# Patient Record
Sex: FEMALE | Race: WHITE | NOT HISPANIC OR LATINO | Employment: FULL TIME | URBAN - METROPOLITAN AREA
[De-identification: names, ages, dates, MRNs, and addresses within clinical notes are randomized per-mention and may not be internally consistent; named-entity substitution may affect disease eponyms.]

---

## 2017-08-24 ENCOUNTER — AMBULATORY - HEALTHEAST (OUTPATIENT)
Dept: MULTI SPECIALTY CLINIC | Facility: CLINIC | Age: 27
End: 2017-08-24

## 2017-08-24 LAB — PAP SMEAR - HIM PATIENT REPORTED: NORMAL

## 2018-10-25 ENCOUNTER — PRENATAL OFFICE VISIT - HEALTHEAST (OUTPATIENT)
Dept: MIDWIFE SERVICES | Facility: CLINIC | Age: 28
End: 2018-10-25

## 2018-10-25 DIAGNOSIS — F41.9 ANXIETY: ICD-10-CM

## 2018-10-25 DIAGNOSIS — M25.50 CHRONIC JOINT PAIN: ICD-10-CM

## 2018-10-25 DIAGNOSIS — N92.6 MISSED MENSES: ICD-10-CM

## 2018-10-25 DIAGNOSIS — Z34.01 ENCOUNTER FOR SUPERVISION OF NORMAL FIRST PREGNANCY IN FIRST TRIMESTER: ICD-10-CM

## 2018-10-25 DIAGNOSIS — Z23 NEED FOR IMMUNIZATION AGAINST INFLUENZA: ICD-10-CM

## 2018-10-25 DIAGNOSIS — G89.29 CHRONIC JOINT PAIN: ICD-10-CM

## 2018-10-25 DIAGNOSIS — G44.229 CHRONIC TENSION HEADACHE: ICD-10-CM

## 2018-10-25 LAB
BASOPHILS # BLD AUTO: 0 THOU/UL (ref 0–0.2)
BASOPHILS NFR BLD AUTO: 0 % (ref 0–2)
EOSINOPHIL # BLD AUTO: 0.1 THOU/UL (ref 0–0.4)
EOSINOPHIL NFR BLD AUTO: 2 % (ref 0–6)
ERYTHROCYTE [DISTWIDTH] IN BLOOD BY AUTOMATED COUNT: 11.9 % (ref 11–14.5)
HCG UR QL: POSITIVE
HCT VFR BLD AUTO: 35.4 % (ref 35–47)
HGB BLD-MCNC: 11.5 G/DL (ref 12–16)
LYMPHOCYTES # BLD AUTO: 1.7 THOU/UL (ref 0.8–4.4)
LYMPHOCYTES NFR BLD AUTO: 31 % (ref 20–40)
MCH RBC QN AUTO: 29.3 PG (ref 27–34)
MCHC RBC AUTO-ENTMCNC: 32.5 G/DL (ref 32–36)
MCV RBC AUTO: 90 FL (ref 80–100)
MONOCYTES # BLD AUTO: 0.5 THOU/UL (ref 0–0.9)
MONOCYTES NFR BLD AUTO: 8 % (ref 2–10)
NEUTROPHILS # BLD AUTO: 3.2 THOU/UL (ref 2–7.7)
NEUTROPHILS NFR BLD AUTO: 59 % (ref 50–70)
PLATELET # BLD AUTO: 188 THOU/UL (ref 140–440)
PMV BLD AUTO: 9.9 FL (ref 8.5–12.5)
RBC # BLD AUTO: 3.92 MILL/UL (ref 3.8–5.4)
TSH SERPL DL<=0.005 MIU/L-ACNC: 1.79 UIU/ML (ref 0.3–5)
WBC: 5.5 THOU/UL (ref 4–11)

## 2018-10-25 ASSESSMENT — MIFFLIN-ST. JEOR: SCORE: 1366.32

## 2018-10-26 LAB
ABO/RH(D): NORMAL
ABORH REPEAT: NORMAL
ANTIBODY SCREEN: NEGATIVE
BACTERIA SPEC CULT: NO GROWTH
COLLECTION METHOD: NORMAL
HBV SURFACE AG SERPL QL IA: NEGATIVE
HCV AB SERPL QL IA: NEGATIVE
LEAD BLD-MCNC: NORMAL UG/DL
LEAD RETEST: NO
RUBV IGG SERPL QL IA: POSITIVE
T PALLIDUM AB SER QL: NEGATIVE

## 2018-10-29 LAB — LEAD BLDV-MCNC: <2 UG/DL (ref 0–4.9)

## 2018-11-19 ENCOUNTER — COMMUNICATION - HEALTHEAST (OUTPATIENT)
Dept: MIDWIFE SERVICES | Facility: CLINIC | Age: 28
End: 2018-11-19

## 2018-11-28 ENCOUNTER — PRENATAL OFFICE VISIT - HEALTHEAST (OUTPATIENT)
Dept: MIDWIFE SERVICES | Facility: CLINIC | Age: 28
End: 2018-11-28

## 2018-11-28 DIAGNOSIS — G44.229 CHRONIC TENSION-TYPE HEADACHE, NOT INTRACTABLE: ICD-10-CM

## 2018-11-28 DIAGNOSIS — Z34.01 ENCOUNTER FOR SUPERVISION OF NORMAL FIRST PREGNANCY IN FIRST TRIMESTER: ICD-10-CM

## 2018-11-28 ASSESSMENT — MIFFLIN-ST. JEOR: SCORE: 1398.07

## 2018-11-29 ENCOUNTER — COMMUNICATION - HEALTHEAST (OUTPATIENT)
Dept: ADMINISTRATIVE | Facility: CLINIC | Age: 28
End: 2018-11-29

## 2019-01-08 ENCOUNTER — AMBULATORY - HEALTHEAST (OUTPATIENT)
Dept: MIDWIFE SERVICES | Facility: CLINIC | Age: 29
End: 2019-01-08

## 2019-01-08 ENCOUNTER — HOSPITAL ENCOUNTER (OUTPATIENT)
Dept: ULTRASOUND IMAGING | Facility: CLINIC | Age: 29
Discharge: HOME | End: 2019-01-08
Attending: ADVANCED PRACTICE MIDWIFE

## 2019-01-08 DIAGNOSIS — Z34.01 ENCOUNTER FOR SUPERVISION OF NORMAL FIRST PREGNANCY IN FIRST TRIMESTER: ICD-10-CM

## 2019-01-10 ENCOUNTER — PRENATAL OFFICE VISIT - HEALTHEAST (OUTPATIENT)
Dept: MIDWIFE SERVICES | Facility: CLINIC | Age: 29
End: 2019-01-10

## 2019-01-10 DIAGNOSIS — Z34.01 ENCOUNTER FOR SUPERVISION OF NORMAL FIRST PREGNANCY IN FIRST TRIMESTER: ICD-10-CM

## 2019-01-10 ASSESSMENT — MIFFLIN-ST. JEOR: SCORE: 1425.29

## 2019-07-08 ENCOUNTER — HOSPITAL ENCOUNTER (OUTPATIENT)
Dept: RADIOLOGY | Facility: CLINIC | Age: 29
Discharge: HOME | End: 2019-07-08
Attending: OBSTETRICS & GYNECOLOGY

## 2019-07-08 DIAGNOSIS — T83.32XA MALPOSITIONED IUD: ICD-10-CM

## 2019-07-10 ASSESSMENT — MIFFLIN-ST. JEOR
SCORE: 1402.61
SCORE: 1402.61

## 2019-07-15 ENCOUNTER — ANESTHESIA - HEALTHEAST (OUTPATIENT)
Dept: SURGERY | Facility: AMBULATORY SURGERY CENTER | Age: 29
End: 2019-07-15

## 2019-07-15 ENCOUNTER — HOSPITAL ENCOUNTER (OUTPATIENT)
Dept: SURGERY | Facility: AMBULATORY SURGERY CENTER | Age: 29
Discharge: HOME | End: 2019-07-15
Attending: OBSTETRICS & GYNECOLOGY | Admitting: OBSTETRICS & GYNECOLOGY
Payer: COMMERCIAL

## 2019-07-15 ENCOUNTER — SURGERY - HEALTHEAST (OUTPATIENT)
Dept: SURGERY | Facility: AMBULATORY SURGERY CENTER | Age: 29
End: 2019-07-15

## 2019-07-15 DIAGNOSIS — Z30.430 ENCOUNTER FOR IUD INSERTION: ICD-10-CM

## 2019-07-15 LAB
DIPSTICK EXPIRATION DATE - HISTORICAL: NORMAL
DIPSTICK LOT NUMBER - HISTORICAL: NORMAL
HGB BLD-MCNC: 14.2 G/DL
POC PREG URINE (HCG) HE - HISTORICAL: NEGATIVE
POC SPECIFIC GRAVITY, URINE - HISTORICAL: NORMAL
POCT KIT EXPIRATION DATE - HISTORICAL: NORMAL
POCT KIT LOT NUMBER HE - HISTORICAL: NORMAL
POCT NEGATIVE CONTROL HE - HISTORICAL: NORMAL
POCT POSITIVE CONTROL HE - HISTORICAL: NORMAL

## 2019-07-15 RX ORDER — OXYCODONE HYDROCHLORIDE 5 MG/1
5-10 TABLET ORAL EVERY 6 HOURS PRN
Qty: 20 TABLET | Refills: 0 | Status: SHIPPED | OUTPATIENT
Start: 2019-07-15 | End: 2021-12-06

## 2019-07-15 ASSESSMENT — MIFFLIN-ST. JEOR
SCORE: 1402.61
SCORE: 1402.61

## 2021-05-30 NOTE — H&P
I have performed an assessment and examined the patient, as necessary, to update the patient's current status that may have changed since the prior History and Physical.  The History & Physical has been reviewed and changes to the patient's status are as follows CV-rrr. Lungs clear.

## 2021-05-30 NOTE — ANESTHESIA PREPROCEDURE EVALUATION
Anesthesia Evaluation      Patient summary reviewed   No history of anesthetic complications     Airway   Mallampati: I  Neck ROM: full   Pulmonary - negative ROS and normal exam                          Cardiovascular - negative ROS and normal exam   Neuro/Psych - negative ROS     Endo/Other - negative ROS      GI/Hepatic/Renal - negative ROS           Dental - normal exam                        Anesthesia Plan  Planned anesthetic: MAC  Will transition to general/ETT if convert to laparoscopy  ASA 1   Induction: intravenous   Anesthetic plan and risks discussed with: patient    Post-op plan: routine recovery

## 2021-05-30 NOTE — ANESTHESIA POSTPROCEDURE EVALUATION
Patient: Haily LEVY Ringmaikel  HYSTEROSCOPY, REMOVAL OF INTRAUTERINE DEVICE, LAPAROSCOPY WITH ULTRASOUND GUIDANCE AND INSERTION OF IUD  Anesthesia type: MAC    Patient location: Phase II Recovery  Last vitals:   Vitals Value Taken Time   /78 7/15/2019  2:45 PM   Temp 36.2  C (97.1  F) 7/15/2019  2:28 PM   Pulse 52 7/15/2019  2:52 PM   Resp 18 7/15/2019  2:30 PM   SpO2 100 % 7/15/2019  2:52 PM   Vitals shown include unvalidated device data.  Post vital signs: stable  Level of consciousness: awake and responds to simple questions  Post-anesthesia pain: pain controlled  Post-anesthesia nausea and vomiting: no  Pulmonary: unassisted  Cardiovascular: stable  Hydration: adequate  Anesthetic events: no    QCDR Measures:  ASA# 11 - Maggie-op Cardiac Arrest: ASA11B - Patient did NOT experience unanticipated cardiac arrest  ASA# 12 - Maggie-op Mortality Rate: ASA12B - Patient did NOT die  ASA# 13 - PACU Re-Intubation Rate: ASA13B - Patient did NOT require a new airway mgmt  ASA# 10 - Composite Anes Safety: ASA10A - No serious adverse event    Additional Notes:

## 2021-05-30 NOTE — OP NOTE
Operative Note    Name:  Haily Lopez  Location: [facility]  Procedure Date:  7/15/2019  PCP:  Provider, No Primary Care        Pre-Procedure Diagnosis:  IUD threads lo started about    Post-Procedure Diagnosis:    Intra-abdominal IUD ks sitting in a bowl of fluid  Procedure: Hysteroscopy, laparoscopy with removal of IUD.  Insertion of Mirena IUD with ultrasound guidance.  Surgeon(s):  Prabhu Burkett MD    Note she came in with pain  Anesthesia Type:  MAC which converted into general    Findings:  Intrauterine cavity without any foreign bodies including IUDs.  A Mirena IUD was sitting in the cul-de-sac and some serous fluid.  There was no obvious perforation spot however there appeared to be an abraded area just medial to the left uterosacral ligament that is likely where this IUD entered the abdominal cavity.  Otherwise uterus ovaries and tubes are normal.    Complications:    None    Specimens:    Mirena IUD    Estimated Blood Loss:   5 mL from 7/15/2019 12:49 PM to 7/15/2019  1:55 PM    Indications:  Lost IUD, pain    Operative Report:    After appropriate consents MAC anesthesia.  She was placed in the dorsolithotomy position prepped draped in usual sterile manner.  Were obtained and signed patient was brought the operating room and given a Francisco catheter was placed.  A bivalve speculum was placed in the vagina and the cervix was injected with 20 cc 1% lidocaine plain divided at 9:00 and 3:00.  The anterior lip of cervix was grasped with some tooth tenaculum and the uterus was sounded to 8 cm.  Hysteroscope was placed using saline as the distention medium and the above findings were noted.  Intraoperative ultrasound through [facility] also did not see an IUD in the uterus.  There was some question of a foreign body behind the uterus.  I thus made the decision to proceed with a laparoscopy.  The patient had to be reprepped and draped as she had her anesthesia converted to general anesthesia.   An infra umbilical incision was made and a pneumoperitoneum was created through a Veress needle.  A 5 mm port was then placed left risk of the sleeve.  A 5 mm port was placed in the left lower quadrant under direct visualization without complication.  The above findings were noted.  Fluid was aspirated.  The IUD was floating in the serous fluid and not connected to any structures.  It was grasped and brought through 1 of the 5 mm ports without difficulty.  Hemostasis was seen in the pelvis.  The pneumoperitoneum was relieved.  30 cc of quarter percent Marcaine was injected in the peritoneal cavity and the trochars were removed.  Incisions were closed with 4-0 Vicryl.  Steri-Strips and bandages were applied.  Attention was then turned to the vaginal area again.  Under ultrasound guidance I placed a Mirena IUD without difficulty.  The lot number is [id number] with an expiration date of September 2021.  Ultrasound verified that the IUD was in the correct position.  Procedure was terminated.  All estimates removed in the vagina.  The patient was awakened taken recovery room in good condition.    Prabhu Burkett     Date: 7/15/2019  Time: 1:55 PM

## 2021-05-30 NOTE — ANESTHESIA CARE TRANSFER NOTE
Last vitals:   Vitals:    07/15/19 1400   BP: (P) 128/60   Pulse: (!) (P) 53   Resp: (P) 18   Temp: (P) 36.1  C (96.9  F)   SpO2: (P) 100%     Patient spontaneous RR, -400s, suctioned, following commands, extubated to facemask 10LPM, O2 sats 100%. VSS. Report to RN.    Patient's level of consciousness is drowsy  Spontaneous respirations: yes  Maintains airway independently: yes  Dentition unchanged: yes  Oropharynx: oropharynx clear of all foreign objects    QCDR Measures:  ASA# 20 - Surgical Safety Checklist: WHO surgical safety checklist completed prior to induction    PQRS# 430 - Adult PONV Prevention: 4558F - Pt received => 2 anti-emetic agents (different classes) preop & intraop  ASA# 8 - Peds PONV Prevention: NA - Not pediatric patient, not GA or 2 or more risk factors NOT present  PQRS# 424 - Maggie-op Temp Management: 4559F - At least one body temp DOCUMENTED => 35.5C or 95.9F within required timeframe  PQRS# 426 - PACU Transfer Protocol: - Transfer of care checklist used  ASA# 14 - Acute Post-op Pain: ASA14B - Patient did NOT experience pain >= 7 out of 10

## 2021-06-02 VITALS — HEIGHT: 68 IN | WEIGHT: 147 LBS | BODY MASS INDEX: 22.28 KG/M2

## 2021-06-02 VITALS — BODY MASS INDEX: 20.31 KG/M2 | WEIGHT: 134 LBS | HEIGHT: 68 IN

## 2021-06-02 VITALS — WEIGHT: 141 LBS | BODY MASS INDEX: 21.37 KG/M2 | HEIGHT: 68 IN

## 2021-06-03 VITALS
BODY MASS INDEX: 21.52 KG/M2 | BODY MASS INDEX: 21.52 KG/M2 | HEIGHT: 68 IN | WEIGHT: 142 LBS | HEIGHT: 68 IN | WEIGHT: 142 LBS

## 2021-06-16 PROBLEM — F41.9 ANXIETY: Status: ACTIVE | Noted: 2018-10-25

## 2021-06-16 PROBLEM — G89.29 CHRONIC JOINT PAIN: Status: ACTIVE | Noted: 2018-10-25

## 2021-06-16 PROBLEM — G44.229 CHRONIC TENSION HEADACHE: Status: ACTIVE | Noted: 2018-10-25

## 2021-06-16 PROBLEM — M25.50 CHRONIC JOINT PAIN: Status: ACTIVE | Noted: 2018-10-25

## 2021-06-21 NOTE — PROGRESS NOTES
PRENATAL VISIT   FIRST OBSTETRICAL EXAM - OB    Assessment / Impression     27 yo  Initial Prenatal Visit  Chronic joint pain  Chronic Headaches  Anxiety, manages with meditation  Last pap: 17, Negative, due 2020  EDPS: 7   Flu vaccine today  Low mood today    Plan:       - IOB labs drawn., Additional labs include: lead screening, thyroid screening (given hx of anxiety) Declines GC/CT screening. and  Pap smear screening not indicated and due 2020.  -Pt is interested in drawing lead level.  -Patient is interested in waterbirth. Hep C drawn today.  -Reviewed prenatal care schedule and discussed routine OB visits versus problem visits and referrals. Also discussed use of ultrasound in pregnancy.  -Declined  early US; reviewed dating. Dating by sure LMP.   -Optimal nutrition and weight gain discussed. Pregnancy weight gain of 25-35 lbs (BMI 18.5-24.9) encouraged.   -Reviewed importance of regular exercise in pregnancy and help with low back pain and other pregnancy symptoms.   -Discussed importance of taking aprenatal vitamin with the goal of having 400 mcg of folic acid. Additionally taking a Vitamin D supplement (1,000-2,000 IU/day) and an Omega 3/fish oil/DHA is beneficial. Research also supports taking 150 mcg of Iodine when pregnant.  -Anticipatory guidance for common pregnancy questions and concerns reviewed.   -Danger s/sx for this trimester reviewed with patient.  -Reviewed genetic carrier screening specific to patients ethnic heritage. Patient declines  -Reviewed genetic aneuploidy screening options. Patient declines NIPS  -Reviewed MyChart, lab results, and how lab results are disseminated.   -IOB packet given and reviewed with patient, discussed standards of care, scope of practice, clinic and hospital settings, also reviewed clinic versus emergency phone number.   -Discussed baby friend hospitals, policies, and recommendations regarding breastfeeding as well as professional and community  support. Discussed the benefits of breastfeeding including: decreased childhood obesity or diabetes, decreased recurrence of ear infections, and decreased chance of hospitalization for respiratory conditions  Additionally, giving  formula instead of breast milk can affect the mother's supply. And formula alters the natural growth of good bacteria in the 's stomach.   -[facility] services and hospital options reviewed; emergency and scheduling phone numbers given to patient.  -Discussed low mood and encouraged Haily and Zac to consider therapy as they have multiple life stressors right now, a recent move, graduate school (Haily), new job (Zac), pregnancy! Offered our support as they navigate these stressors. Encouraged Haily to call if her mood worsens and she is feeling depressed or anxious  -Return to clinic 4 weeks    Total time spent with patient: 60 minutes, >50% time spent counseling and coordinating care.    Subjective:      Haily Lopez is a 28 y.o.  here today for her First Obstetrical Exam.    LMP: 18, bled for 4-5, typically has a 25-28 day cycle. Based on LMP she is 9 weeks 6 days  Has been nauseous and fatigued  Does not have any dietary restrictions. Eats a variety of food.   Feeling a little blue since being pregnant. She has a history of anxiety, but has not felt down like this before. She has a therapist she has seen for her anxiety, and she knows she can schedule a meeting with her as needed. Currently meditation has been sufficient to manage her anxiety  She is finishing her Masters in [employer] at [employer], graduating in May, will be a [employer] , but plans to wait to work until 2019. Just moved to [place] from [facility]    Haily has chronic joint pain and sees Dr. Ruano in [place] at the [facility]. She feels it is currently under control  Haily also has chronic headaches, usually twice a week, so far she has had fewer in pregnancy. She manages them with  hydration and tylenol     Exercise: a little less due to fatigue, typically bikes, and walks  Safety (seatbelt, gun access, IPV, hx abuse): No  Tobacco/Alcohol/Drug Use: No, occasional sips of wine  EPDS today: 7  Feeding Plans Breastfeeding.    Education level: working on Masters in [employer]  Occupation: Graduate school student at [employer]  Partners name: Zac,     OB History    Para Term  AB Living   1        SAB TAB Ectopic Multiple Live Births             # Outcome Date GA Lbr Rocky/2nd Weight Sex Delivery Anes PTL Lv   1 Current                   Expected Date of Delivery: 18    Past Medical History:   Diagnosis Date     Anemia     on and off     Chronic joint pain     may have been triggered by Lyme's disease, chronic pain clinic     Depression     Anxiety, has a prn med clonadapam, therapy     Migraine     headaches during pregnancy, headache every 2 weeks, treats with hydration, tylenol     Urinary tract infection     occasional     Past Surgical History:   Procedure Laterality Date     WISDOM TOOTH EXTRACTION      no reaction to anesthesia     Social History   Substance Use Topics     Smoking status: Never Smoker     Smokeless tobacco: Never Used     Alcohol use None     Current Outpatient Prescriptions   Medication Sig Dispense Refill     acetaminophen (TYLENOL EXTRA STRENGTH) 500 MG tablet Take 500 mg by mouth as needed for pain.       cetirizine (ZYRTEC) 10 MG tablet Take 10 mg by mouth as needed.        cholecalciferol, vitamin D3, 1,000 unit capsule Take by mouth.       fluticasone (FLONASE) 50 mcg/actuation nasal spray 1 spray into each nostril.       PNV cmb#95-ferrous fumarate-FA (PRENATAL VITAMIN WITH MINERALS) 28 mg iron- 800 mcg Tab Take by mouth.       No current facility-administered medications for this visit.      No Known Allergies          Pregnancy Risk Factors: None    Review of Systems  General:  Denies problem  Eyes: Denies problem  Ears/Nose/Throat:  "Denies problem  Cardiovascular: Denies problem  Respiratory:  Denies problem  Gastrointestinal:  Denies problem  Genitourinary: Denies problem  Musculoskeletal:  Denies problem  Skin: Denies problem  Neurologic: Denies problem  Psychiatric: Denies problem  Endocrine: Denies problem  Heme/Lymphatic: Denies problem   Allergic/Immunologic: Denies problem       Objective:   Objective    Vitals:    10/25/18 0859   BP: 96/60   Pulse: 92   Weight: 134 lb (60.8 kg)   Height: 5' 8\" (1.727 m)     Physical Exam:  General Appearance: Alert, cooperative, no distress, appears stated age  Head: Normocephalic, without obvious abnormality, atraumatic  Eyes: Conjunctiva/corneas clear, does not wear corrective lenses  Neck: Supple, symmetrical, trachea midline, no adenopathy  Thyroid: not enlarged, symmetric, no tenderness/mass/nodules  Back: Symmetric, no curvature, ROM normal, no CVA tenderness  Lungs: Clear to auscultation bilaterally, respirations unlabored  Heart: Regular rate and rhythm, S1 and S2 normal, no murmur, rub, or gallop  Breasts: No breast masses, tenderness, asymmetry, or nipple discharge. Nipples are everted.  Abdomen: Soft, non-tender, no masses.   FHT:  180   Vulva:  no sign of lesions or condyloma, normal hair distribution  Vagina: pink, normal rugae, no abnormal discharge  Cervix:  long/thick/closed, no lesions or inflammation noted, negative CMT with exam  Uterus: mid/anterverted position, non tender, enlarged approximately 10 weeks size  Adnexa:  no masses appreciated, non-tender with palpation  Pelvic spines:AVERAGE  Sacrum:CONCAVE  Suprapubic Arch:NORMAL  Pelvis type:gynecoid            Extremities: Extremities normal, atraumatic, no cyanosis or edema  Skin: Skin color, texture, turgor normal, no rashes or lesions  Lymph nodes: Cervical, supraclavicular, and axillary nodes normal  Neurologic: Alert and oriented x 3.    Lab:   Results for orders placed or performed in visit on 10/25/18   Pregnancy (Beta-hCG, " Qual), Urine   Result Value Ref Range    Pregnancy Test, Urine Positive (!) Negative

## 2021-06-22 NOTE — PROGRESS NOTES
Haily presents with Zac today  Reviewed normal FAS from 1/8/19 which reveals a SLIUP, normal anatomy, posterior placenta, EFW 64%  Haily and Zac are considering transferring to a birth center for their care. They have multiple questions about our practice: typical birth practices, indications for induction, etc. Answered all questions and offered a follow up phone call if she has any further questions  Haily is Graduating 5/19/19 Masters in [employer] from [employer]. She is hoping the baby doesn't come before then (EDB: 5/24/19)  Nutrition: eating a well balanced diet, small frequent meals, trying to get adequate protein  Exercise:   Mood: Feels stable, managing her stress well  Discussed the benefits of  care and gave references  Water birth consent form signed     92

## 2021-06-22 NOTE — PROGRESS NOTES
Haily presents to the clinic by herself.  (Please see telephone note below.)  Occasional right-sided lower abdominal sharp pain with sneezing unless she braces herself by tightening abdominal.  Discomfort is not constant, and she denies dysuria, unusual vaginal discharge/pruritus/irritation/malodor, vaginal bleeding or loss of fluid.  Headaches (which predate pregnancy) continue intermittently, helped by acetaminophen, a caffeinated beverage, rest and a dark room.  Endorses sensitivity to light.  Last eyeglass prescription was over 2 years ago.  These are not the worst headaches of her life.  This writer recommends the following comfort measures: Acetaminophen as directed, massage, chiropractic medicine, caffeine, adequate hydration, rest.  She is regarding [facility] nurse midwives statistics regarding  birth, episiotomy and other interventions which were reviewed in their entirety.  This patient is exploring the possibility of another birth setting possibly with a smaller midwife practice.  Initial OB lab results reviewed and all WNL.  Declines second trimester genetic screening.  20-week fetal anatomy survey ultrasound was ordered, and patient will RTC in 6 weeks.  Second trimester teaching completed.  Danger signs and symptoms reviewed.  All questions answered.  Encouraged to call or return to clinic with any questions, concerns, or as needed.

## 2021-06-27 ENCOUNTER — HEALTH MAINTENANCE LETTER (OUTPATIENT)
Age: 31
End: 2021-06-27

## 2021-06-30 ENCOUNTER — THERAPY VISIT (OUTPATIENT)
Dept: PHYSICAL THERAPY | Facility: CLINIC | Age: 31
End: 2021-06-30
Payer: COMMERCIAL

## 2021-06-30 DIAGNOSIS — M62.08 DIASTASIS RECTI: ICD-10-CM

## 2021-06-30 PROCEDURE — 97110 THERAPEUTIC EXERCISES: CPT | Mod: GP | Performed by: PHYSICAL THERAPIST

## 2021-06-30 PROCEDURE — 97112 NEUROMUSCULAR REEDUCATION: CPT | Mod: GP | Performed by: PHYSICAL THERAPIST

## 2021-06-30 PROCEDURE — 97161 PT EVAL LOW COMPLEX 20 MIN: CPT | Mod: GP | Performed by: PHYSICAL THERAPIST

## 2021-06-30 NOTE — PROGRESS NOTES
Physical Therapy Initial Evaluation  Subjective:  The history is provided by the patient. No  was used.   Patient Health History  Haily Lopez being seen for Diastasis Recti.     Problem began: 5/22/2019.   Problem occurred: My last pregnancy   Pain is reported as 0/10 on pain scale.  General health as reported by patient is excellent.  Pertinent medical history includes: anemia and migraines/headaches. Other medical history details: Chronic joint pain from 9623-3667.     Medical allergies: none.   Surgeries include:  Other. Other surgery history details: laproscopic abdominal surgery to remove IUD after perforation.    Current medications:  None.    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                  Therapist Generated HPI Evaluation  Problem details: Patient reports she had a large baby (10# plus) with her first delivery.  Had a normal vaginal birth;  A lot of tearing but no residual symptoms.  Has been working with a strength LENNY which has been helpful to assist with abdominal strength..         Type of problem:  Other (Abdominal).    This is a new condition.  Condition occurred with:  After pregnancy.  Where condition occurred: other.  Patient reports pain:  N/a.      Since onset symptoms are gradually improving.         Restrictions due to condition include:  Working in normal job without restrictions.  Barriers include:  None as reported by patient.                        Objective:  System                                 Pelvic Dysfunction Evaluation:          Abdominal Wall:  Abdominal wall pelvic: Superior 0-5 cm 3 finger width 5-8 cm 3 finger width  Inferior 0-5cm 3 finger width.  Diastasis Recti:  Present  Trigger Points:  Transverse abdominals, internal obliques and external obliques              Additional History:  Delivery History:  Vaginal delivery  Number of Pregnancies: 1  Number of Live Births: 1                        General     ROS    Assessment/Plan:    Patient is a 31 year old female with pelvic complaints.    Patient has the following significant findings with corresponding treatment plan.                Diagnosis 1:  Abdominal; Diastasis Recti  Decreased strength - therapeutic exercise and therapeutic activities  Impaired muscle performance - neuro re-education  Decreased function - therapeutic activities        Previous and current functional limitations:  (See Goal Flow Sheet for this information)    Short term and Long term goals: (See Goal Flow Sheet for this information)     Communication ability:  Patient appears to be able to clearly communicate and understand verbal and written communication and follow directions correctly.  Treatment Explanation - The following has been discussed with the patient:   RX ordered/plan of care  Anticipated outcomes  Possible risks and side effects  This patient would benefit from PT intervention to resume normal activities.   Rehab potential is good.    Frequency:  1 X week, once daily  Duration:  for 6 weeks  Discharge Plan:  Achieve all LTG.  Independent in home treatment program.  Reach maximal therapeutic benefit.    Please refer to the daily flowsheet for treatment today, total treatment time and time spent performing 1:1 timed codes.

## 2021-06-30 NOTE — LETTER
[facility]  [address]  [facility] [address]  [phone]    2021    Re: Haily Lopez   :   1990  MRN:  [id number]   REFERRING PHYSICIAN:   Niki [facility]EVY [facility]  Date of Initial Evaluation: 21  Visits:  Rxs Used: 1  Reason for Referral:  Diastasis recti    EVALUATION SUMMARY    Physical Therapy Initial Evaluation    Subjective:  The history is provided by the patient. No  was used.     Patient Health History  Haily Lopez being seen for Diastasis Recti.     Problem began: 2019.   Problem occurred: My last pregnancy   Pain is reported as 0/10 on pain scale.  General health as reported by patient is excellent.  Pertinent medical history includes: anemia and migraines/headaches. Other medical history details: Chronic joint pain from 1378-3596.     Medical allergies: none.   Surgeries include:  Other. Other surgery history details: laproscopic abdominal surgery to remove IUD after perforation.    Current medications:  None.    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                Therapist Generated HPI Evaluation  Problem details: Patient reports she had a large baby (10# plus) with her first delivery.  Had a normal vaginal birth;  A lot of tearing but no residual symptoms.  Has been working with a strength LENNY which has been helpful to assist with abdominal strength..         Type of problem:  Other (Abdominal).    This is a new condition.  Condition occurred with:  After pregnancy.  Where condition occurred: other.  Patient reports pain:  N/a.    Since onset symptoms are gradually improving.   Restrictions due to condition include:  Working in normal job without restrictions.  Barriers include:  None as reported by patient.    Objective:       Pelvic Dysfunction Evaluation:      Abdominal Wall:  Abdominal  wall pelvic: Superior 0-5 cm 3 finger width 5-8 cm 3 finger width  Inferior 0-5cm 3 finger width.  Diastasis Recti:  Present  Trigger Points:  Transverse abdominals, internal obliques and external obliques    Additional History:  Delivery History:  Vaginal delivery  Number of Pregnancies: 1  Number of Live Births: 1    Assessment/Plan:    Patient is a 31 year old female with pelvic complaints.    Patient has the following significant findings with corresponding treatment plan.                  Diagnosis 1:  Abdominal; Diastasis Recti  Decreased strength - therapeutic exercise and therapeutic activities  Impaired muscle performance - neuro re-education  Decreased function - therapeutic activities    Previous and current functional limitations:  (See Goal Flow Sheet for this information)    Short term and Long term goals: (See Goal Flow Sheet for this information)     Communication ability:  Patient appears to be able to clearly communicate and understand verbal and written communication and follow directions correctly.    Treatment Explanation - The following has been discussed with the patient:   RX ordered/plan of care  Anticipated outcomes  Possible risks and side effects    This patient would benefit from PT intervention to resume normal activities.   Rehab potential is good.  Frequency:  1 X week, once daily  Duration:  for 6 weeks  Discharge Plan:  Achieve all LTG.  Independent in home treatment program.  Reach maximal therapeutic benefit.    Thank you for your referral.    INQUIRIES  Therapist: Janie Chun DPT  [facility]  [address]  [facility] [address]  Phone: [phone]  Fax: [phone]

## 2021-08-11 PROBLEM — M62.08 DIASTASIS RECTI: Status: RESOLVED | Noted: 2021-06-30 | Resolved: 2021-08-11

## 2021-10-17 ENCOUNTER — HEALTH MAINTENANCE LETTER (OUTPATIENT)
Age: 31
End: 2021-10-17

## 2021-11-17 ENCOUNTER — TELEPHONE (OUTPATIENT)
Dept: ENDOCRINOLOGY | Facility: CLINIC | Age: 31
End: 2021-11-17
Payer: COMMERCIAL

## 2021-11-17 NOTE — TELEPHONE ENCOUNTER
[facility] Phone: [phone]  Spoke w/  to please include lab results with referral.   Izabel Vaca RN on 11/17/2021 at 1:56 PM

## 2021-11-17 NOTE — TELEPHONE ENCOUNTER
Lindsay Cervantes, RN at [facility]  States Pt is pregnant and being seen at [facility] Due March 18, 2022  subclinical hypothyroidism  Anemia  Fatigue  Requesting Urgent Consult   Will fax referral today.   Endo Team notified.   Izabel Vaca RN on 11/17/2021 at 11:43 AM

## 2021-11-18 ENCOUNTER — TRANSCRIBE ORDERS (OUTPATIENT)
Dept: OTHER | Age: 31
End: 2021-11-18
Payer: COMMERCIAL

## 2021-11-18 DIAGNOSIS — R53.83 FATIGUE: ICD-10-CM

## 2021-11-18 DIAGNOSIS — Z3A.22 22 WEEKS GESTATION OF PREGNANCY: Primary | ICD-10-CM

## 2021-11-18 DIAGNOSIS — D64.9 ANEMIA: ICD-10-CM

## 2021-11-18 DIAGNOSIS — E03.8 SUBCLINICAL HYPOTHYROIDISM: ICD-10-CM

## 2021-12-05 NOTE — PROGRESS NOTES
Endocrinology Note         Haily is a 31 year old female presents today for abnormal thyroid function test during pregnancy    HPI  Haily is a 31 year old female presents today for abnormal thyroid function test during pregnancy    She is currently 25 weeks pregnant. This is her second pregnancy.     She has been feeling extremely fatigue throughout pregnancy. She feels that she has to take a nap in the afternoon for 2 hours. She also notices brain fog. She feels some slight improvement this past few weeks. She denied altered bowel movement, temperature intolerance. She has not had dizziness, lightheadedness, chest pain, SOB. She gained weight properly. She feels that her sleep is disrupted. Her first pregnancy was 2.5 years ago. She did not have this symptom during her first pregnancy or in-between pregnancy.    Lab at her midwife's clinic on 10/28/2021 showed TSH 1.67, total T4 2.1 and total T3 21.    She takes prenatal vitamin that contains biotin 35 mcg, iron, magnesium.    No thyroid disease in the family    Past Medical History  No past medical history on file.    Allergies  No Known Allergies     Medications  Current Outpatient Medications   Medication Sig Dispense Refill     oxyCODONE (ROXICODONE) 5 MG immediate release tablet [OXYCODONE (ROXICODONE) 5 MG IMMEDIATE RELEASE TABLET] Take 1-2 tablets (5-10 mg total) by mouth every 6 (six) hours as needed for pain. 20 tablet 0     Family History  family history includes Arthritis in her maternal grandfather, maternal grandmother, and paternal grandmother; Asthma in her father; Dementia in her maternal grandmother; Depression in her mother; Diabetes in her maternal grandmother; Early Death (age of onset: 50.00) in her paternal grandfather; Hearing Loss in her maternal grandfather; Heart Disease in her maternal grandfather; Hypertension in her maternal grandfather; Other - See Comments in her paternal grandfather.     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[employer]    Lives at home    ROS  Constitutional: no weight change, +low energy  Eyes: no vision change, diplopia or red eyes   Neck: no difficulty swallowing, no choking, no neck pain, no neck swelling  Cardiovascular: no chest pain, palpitations  Respiratory: no dyspnea, cough, shortness of breath or wheezing   GI: no nausea, vomiting, diarrhea or constipation, no abdominal pain   : no change in urine, no dysuria or hematuria  Musculoskeletal: no joint or muscle pain or swelling   Integumentary: no concerning lesions   Neuro: no loss of strength or sensation, no numbness or tingling, no tremor, no dizziness, no headache   Endo: no polyuria or polydipsia, no temperature intolerance   Heme/Lymph: no concerning bumps, no bleeding problems   Allergy: no environmental allergies   Psych: no depression or anxiety, needs to take a nap in the afternoon during this pregnany.    Physical Exam  Limited due to virtual visit  Constitutional: no distress, comfortable, pleasant   Eyes: anicteric,no lid lag or retraction  Skin:no jaundice   Psychological: appropriate mood       RESULTS  I have personally reviewed labs and images. I also reviewed labs with patient and discussed the result and plan of care.  ENDO THYROID LABS-[facility] Latest Ref Rng & Units 10/25/2018   TSH 0.30 - 5.00 uIU/mL 1.79     Lab on 10/28/2021      ASSESSMENT:    Haily is a 31 year old female presents today for abnormal thyroid function test during pregnancy    1) Abnormal thyroid function test during second trimester of pregnancy: she has extreme fatigue throughout this pregnancy. No prior thyroid disease or family hx of thyroid disease. Her labs 6 weeks ago showed normal TSH but low total T4  and T3 which is quite unusual as binding protein is commonly high during pregnancy and it is expected to see higher T4 and T3.   - we will start with repeating lab today.     2) 25 weeks pregnancy    PLAN:   - recheck TSH, FT4, total T4, total T3, TPO Ab    Start: 12/06/2021 10:38 am  Stop: 12/06/2021 10:52 am  VDO duration: 14 minutes    External notes/medical records independently reviewed, labs and imaging independently reviewed, medical management and tests to be discussed/communicated to patient.    Time: I spent 37 minutes spent on the date of the encounter preparing to see patient (including chart review and preparation), obtaining and or reviewing additional medical history, performing a physical exam and evaluation, documenting clinical information in the electronic health record, independently interpreting results, communicating results to the patient and coordinating care.    Hoang Pete MD  Division of Diabetes and Endocrinology  Department of Medicine  [phone]

## 2021-12-06 ENCOUNTER — TELEPHONE (OUTPATIENT)
Dept: ENDOCRINOLOGY | Facility: CLINIC | Age: 31
End: 2021-12-06

## 2021-12-06 ENCOUNTER — VIRTUAL VISIT (OUTPATIENT)
Dept: ENDOCRINOLOGY | Facility: CLINIC | Age: 31
End: 2021-12-06
Payer: COMMERCIAL

## 2021-12-06 DIAGNOSIS — R94.6 THYROID FUNCTION TEST ABNORMAL: Primary | ICD-10-CM

## 2021-12-06 PROCEDURE — 99203 OFFICE O/P NEW LOW 30 MIN: CPT | Mod: GT | Performed by: INTERNAL MEDICINE

## 2021-12-06 RX ORDER — CHLORAL HYDRATE 500 MG
CAPSULE ORAL
COMMUNITY

## 2021-12-06 RX ORDER — SWAB
SWAB, NON-MEDICATED MISCELLANEOUS
COMMUNITY

## 2021-12-06 NOTE — PROGRESS NOTES
Haily is a 31 year old who is being evaluated via a billable video visit.      How would you like to obtain your AVS? MyChart  If the video visit is dropped, the invitation should be resent by: Send to e-mail at: [email or web address]  Will anyone else be joining your video visit? No      Mona HUTSON MA   [facility]

## 2021-12-06 NOTE — LETTER
12/6/2021         RE: Haily Lopez  [address] [place] [address]        Dear Colleague,    Thank you for referring your patient, Haily Lopez, to the [facility]. Please see a copy of my visit note below.         Endocrinology Note         Haily is a 31 year old female presents today for abnormal thyroid function test during pregnancy    HPI  Haily is a 31 year old female presents today for abnormal thyroid function test during pregnancy    She is currently 25 weeks pregnant. This is her second pregnancy.     She has been feeling extremely fatigue throughout pregnancy. She feels that she has to take a nap in the afternoon for 2 hours. She also notices brain fog. She feels some slight improvement this past few weeks. She denied altered bowel movement, temperature intolerance. She has not had dizziness, lightheadedness, chest pain, SOB. She gained weight properly. She feels that her sleep is disrupted. Her first pregnancy was 2.5 years ago. She did not have this symptom during her first pregnancy or in-between pregnancy.    Lab at her midwife's clinic on 10/28/2021 showed TSH 1.67, total T4 2.1 and total T3 21.    She takes prenatal vitamin that contains biotin 35 mcg, iron, magnesium.    No thyroid disease in the family    Past Medical History  No past medical history on file.    Allergies  No Known Allergies     Medications  Current Outpatient Medications   Medication Sig Dispense Refill     oxyCODONE (ROXICODONE) 5 MG immediate release tablet [OXYCODONE (ROXICODONE) 5 MG IMMEDIATE RELEASE TABLET] Take 1-2 tablets (5-10 mg total) by mouth every 6 (six) hours as needed for pain. 20 tablet 0     Family History  family history includes Arthritis in her maternal grandfather, maternal grandmother, and paternal grandmother; Asthma in her father; Dementia in her maternal grandmother; Depression in her mother; Diabetes in her maternal grandmother; Early Death (age of onset:  50.00) in her paternal grandfather; Hearing Loss in her maternal grandfather; Heart Disease in her maternal grandfather; Hypertension in her maternal grandfather; Other - See Comments in her paternal grandfather.     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    ROS  Constitutional: no weight change, +low energy  Eyes: no vision change, diplopia or red eyes   Neck: no difficulty swallowing, no choking, no neck pain, no neck swelling  Cardiovascular: no chest pain, palpitations  Respiratory: no dyspnea, cough, shortness of breath or wheezing   GI: no nausea, vomiting, diarrhea or constipation, no abdominal pain   : no change in urine, no dysuria or hematuria  Musculoskeletal: no joint or muscle pain or swelling   Integumentary: no concerning lesions   Neuro: no loss of strength or sensation, no numbness or tingling, no tremor, no dizziness, no headache   Endo: no polyuria or polydipsia, no temperature intolerance   Heme/Lymph: no concerning bumps, no bleeding problems   Allergy: no environmental allergies   Psych: no depression or anxiety, needs to take a nap in the afternoon during this pregnany.    Physical Exam  Limited due to virtual visit  Constitutional: no distress, comfortable, pleasant   Eyes: anicteric,no lid lag or retraction  Skin:no jaundice   Psychological: appropriate mood       RESULTS  I have personally reviewed labs and images. I also reviewed labs with patient and discussed the result and plan of care.  ENDO THYROID LABS-[facility] Latest Ref Rng & Units 10/25/2018   TSH 0.30 - 5.00 uIU/mL 1.79     Lab on 10/28/2021      ASSESSMENT:    Haily is a 31 year old female presents today for abnormal thyroid function test during  pregnancy    1) Abnormal thyroid function test during second trimester of pregnancy: she has extreme fatigue throughout this pregnancy. No prior thyroid disease or family hx of thyroid disease. Her labs 6 weeks ago showed normal TSH but low total T4 and T3 which is quite unusual as binding protein is commonly high during pregnancy and it is expected to see higher T4 and T3.   - we will start with repeating lab today.     2) 25 weeks pregnancy    PLAN:   - recheck TSH, FT4, total T4, total T3, TPO Ab    Start: 12/06/2021 10:38 am  Stop: 12/06/2021 10:52 am  VDO duration: 14 minutes    External notes/medical records independently reviewed, labs and imaging independently reviewed, medical management and tests to be discussed/communicated to patient.    Time: I spent 37 minutes spent on the date of the encounter preparing to see patient (including chart review and preparation), obtaining and or reviewing additional medical history, performing a physical exam and evaluation, documenting clinical information in the electronic health record, independently interpreting results, communicating results to the patient and coordinating care.    Hoang Pete MD  Division of Diabetes and Endocrinology  Department of Medicine  [phone]      Haily is a 31 year old who is being evaluated via a billable video visit.      How would you like to obtain your AVS? MyChart  If the video visit is dropped, the invitation should be resent by: Send to e-mail at: [email or web address]  Will anyone else be joining your video visit? No      Mona HUTSON MA   [facility]   [facility]        Again, thank you for allowing me to participate in the care of your patient.        Sincerely,        Hoang Pete MD

## 2021-12-06 NOTE — PATIENT INSTRUCTIONS
Lab today    If you have any questions, please do not hesitate to call [facility] at [phone] and ask for Endocrinology clinic.    Sincerely,    Hoang Pete MD  Endocrinology

## 2021-12-06 NOTE — TELEPHONE ENCOUNTER
Records received and emailed to provider to review    Mona HUTSON MA   Adult Endocrine   [facility]

## 2021-12-06 NOTE — TELEPHONE ENCOUNTER
Called [facility] and requested for chart notes/labs as they were never received.    Will await for records.    Mona HUTSON MA   Adult Endocrine   [facility]

## 2021-12-08 ENCOUNTER — LAB (OUTPATIENT)
Dept: LAB | Facility: CLINIC | Age: 31
End: 2021-12-08
Payer: COMMERCIAL

## 2021-12-08 DIAGNOSIS — R94.6 THYROID FUNCTION TEST ABNORMAL: ICD-10-CM

## 2021-12-08 LAB
T3 SERPL-MCNC: 122 NG/DL (ref 60–181)
T4 FREE SERPL-MCNC: 0.84 NG/DL (ref 0.76–1.46)
T4 SERPL-MCNC: 12.8 UG/DL (ref 4.5–13.9)
TSH SERPL DL<=0.005 MIU/L-ACNC: 1.99 MU/L (ref 0.4–4)

## 2021-12-08 PROCEDURE — 84443 ASSAY THYROID STIM HORMONE: CPT

## 2021-12-08 PROCEDURE — 36415 COLL VENOUS BLD VENIPUNCTURE: CPT

## 2021-12-08 PROCEDURE — 86376 MICROSOMAL ANTIBODY EACH: CPT

## 2021-12-08 PROCEDURE — 84439 ASSAY OF FREE THYROXINE: CPT

## 2021-12-08 PROCEDURE — 84480 ASSAY TRIIODOTHYRONINE (T3): CPT

## 2021-12-09 LAB — THYROPEROXIDASE AB SERPL-ACNC: <10 IU/ML

## 2022-01-25 ENCOUNTER — TELEPHONE (OUTPATIENT)
Dept: OBGYN | Facility: CLINIC | Age: 32
End: 2022-01-25
Payer: COMMERCIAL

## 2022-01-25 NOTE — TELEPHONE ENCOUNTER
Reason for Call:  Other call back    Detailed comments: Patient called wanting to tranfer established care     Phone Number Patient can be reached at: Home number on file [phone] (home)    Best Time: anytime    Can we leave a detailed message on this number? YES    Call taken on 1/25/2022 at 9:45 AM by Mary Corona

## 2022-01-25 NOTE — TELEPHONE ENCOUNTER
LMTCB. Please assist w/ scheduling patient with either Ana Shea or Dr. Kathleen Cárdenas. They're both booking out to March for [facility] [place] [facility].

## 2022-02-01 ENCOUNTER — MEDICAL CORRESPONDENCE (OUTPATIENT)
Dept: HEALTH INFORMATION MANAGEMENT | Facility: CLINIC | Age: 32
End: 2022-02-01
Payer: COMMERCIAL

## 2022-02-01 DIAGNOSIS — D50.8 IRON DEFICIENCY ANEMIA SECONDARY TO INADEQUATE DIETARY IRON INTAKE: Primary | ICD-10-CM

## 2022-02-01 RX ORDER — ALBUTEROL SULFATE 0.83 MG/ML
2.5 SOLUTION RESPIRATORY (INHALATION)
Status: CANCELLED | OUTPATIENT
Start: 2022-02-01

## 2022-02-01 RX ORDER — MEPERIDINE HYDROCHLORIDE 25 MG/ML
25 INJECTION INTRAMUSCULAR; INTRAVENOUS; SUBCUTANEOUS EVERY 30 MIN PRN
Status: CANCELLED | OUTPATIENT
Start: 2022-02-01

## 2022-02-01 RX ORDER — DIPHENHYDRAMINE HYDROCHLORIDE 50 MG/ML
50 INJECTION INTRAMUSCULAR; INTRAVENOUS
Status: CANCELLED
Start: 2022-02-01

## 2022-02-01 RX ORDER — METHYLPREDNISOLONE SODIUM SUCCINATE 125 MG/2ML
125 INJECTION, POWDER, LYOPHILIZED, FOR SOLUTION INTRAMUSCULAR; INTRAVENOUS
Status: CANCELLED
Start: 2022-02-01

## 2022-02-01 RX ORDER — HEPARIN SODIUM (PORCINE) LOCK FLUSH IV SOLN 100 UNIT/ML 100 UNIT/ML
5 SOLUTION INTRAVENOUS
Status: CANCELLED | OUTPATIENT
Start: 2022-02-01

## 2022-02-01 RX ORDER — EPINEPHRINE 1 MG/ML
0.3 INJECTION, SOLUTION, CONCENTRATE INTRAVENOUS EVERY 5 MIN PRN
Status: CANCELLED | OUTPATIENT
Start: 2022-02-01

## 2022-02-01 RX ORDER — ALBUTEROL SULFATE 90 UG/1
1-2 AEROSOL, METERED RESPIRATORY (INHALATION)
Status: CANCELLED
Start: 2022-02-01

## 2022-02-01 RX ORDER — NALOXONE HYDROCHLORIDE 0.4 MG/ML
0.2 INJECTION, SOLUTION INTRAMUSCULAR; INTRAVENOUS; SUBCUTANEOUS
Status: CANCELLED | OUTPATIENT
Start: 2022-02-01

## 2022-02-01 RX ORDER — HEPARIN SODIUM,PORCINE 10 UNIT/ML
5 VIAL (ML) INTRAVENOUS
Status: CANCELLED | OUTPATIENT
Start: 2022-02-01

## 2022-02-08 ENCOUNTER — INFUSION THERAPY VISIT (OUTPATIENT)
Dept: INFUSION THERAPY | Facility: CLINIC | Age: 32
End: 2022-02-08
Payer: COMMERCIAL

## 2022-02-08 VITALS
SYSTOLIC BLOOD PRESSURE: 101 MMHG | DIASTOLIC BLOOD PRESSURE: 59 MMHG | TEMPERATURE: 97.8 F | RESPIRATION RATE: 16 BRPM | HEART RATE: 109 BPM | OXYGEN SATURATION: 95 %

## 2022-02-08 DIAGNOSIS — D50.8 IRON DEFICIENCY ANEMIA SECONDARY TO INADEQUATE DIETARY IRON INTAKE: Primary | ICD-10-CM

## 2022-02-08 PROCEDURE — 96365 THER/PROPH/DIAG IV INF INIT: CPT | Performed by: INTERNAL MEDICINE

## 2022-02-08 PROCEDURE — 99207 PR NO CHARGE LOS: CPT

## 2022-02-08 RX ORDER — HEPARIN SODIUM,PORCINE 10 UNIT/ML
5 VIAL (ML) INTRAVENOUS
Status: CANCELLED | OUTPATIENT
Start: 2022-02-10

## 2022-02-08 RX ORDER — EPINEPHRINE 1 MG/ML
0.3 INJECTION, SOLUTION INTRAMUSCULAR; SUBCUTANEOUS EVERY 5 MIN PRN
Status: CANCELLED | OUTPATIENT
Start: 2022-02-10

## 2022-02-08 RX ORDER — ALBUTEROL SULFATE 0.83 MG/ML
2.5 SOLUTION RESPIRATORY (INHALATION)
Status: CANCELLED | OUTPATIENT
Start: 2022-02-10

## 2022-02-08 RX ORDER — NALOXONE HYDROCHLORIDE 0.4 MG/ML
0.2 INJECTION, SOLUTION INTRAMUSCULAR; INTRAVENOUS; SUBCUTANEOUS
Status: CANCELLED | OUTPATIENT
Start: 2022-02-10

## 2022-02-08 RX ORDER — METHYLPREDNISOLONE SODIUM SUCCINATE 125 MG/2ML
125 INJECTION, POWDER, LYOPHILIZED, FOR SOLUTION INTRAMUSCULAR; INTRAVENOUS
Status: CANCELLED
Start: 2022-02-10

## 2022-02-08 RX ORDER — MEPERIDINE HYDROCHLORIDE 25 MG/ML
25 INJECTION INTRAMUSCULAR; INTRAVENOUS; SUBCUTANEOUS EVERY 30 MIN PRN
Status: CANCELLED | OUTPATIENT
Start: 2022-02-10

## 2022-02-08 RX ORDER — ALBUTEROL SULFATE 90 UG/1
1-2 AEROSOL, METERED RESPIRATORY (INHALATION)
Status: CANCELLED
Start: 2022-02-10

## 2022-02-08 RX ORDER — DIPHENHYDRAMINE HYDROCHLORIDE 50 MG/ML
50 INJECTION INTRAMUSCULAR; INTRAVENOUS
Status: CANCELLED
Start: 2022-02-10

## 2022-02-08 RX ORDER — HEPARIN SODIUM (PORCINE) LOCK FLUSH IV SOLN 100 UNIT/ML 100 UNIT/ML
5 SOLUTION INTRAVENOUS
Status: CANCELLED | OUTPATIENT
Start: 2022-02-10

## 2022-02-08 RX ADMIN — Medication 250 ML: at 09:57

## 2022-02-08 ASSESSMENT — PAIN SCALES - GENERAL: PAINLEVEL: NO PAIN (0)

## 2022-02-08 NOTE — PROGRESS NOTES
Infusion Nursing Note:  Haily LEVY Ruhtmaikel presents today for Venofer 1/5.    Patient seen by provider today: No   present during visit today: Not Applicable.    Note: Patient oriented to infusion room, including call light and bathrooms.    Intravenous Access:  Peripheral IV placed.    Treatment Conditions:  Not Applicable.    Post Infusion Assessment:  Patient tolerated infusion without incident.  Blood return noted pre and post infusion.  Site patent and intact, free from redness, edema or discomfort.  No evidence of extravasations.  Access discontinued per protocol.     Discharge Plan:   Patient will return 2/10/2022 for next appointment.   Patient discharged in stable condition accompanied by: self.  Departure Mode: Ambulatory.    Miracle Dorsey RN-BSN, PHN, OCN  [facility]

## 2022-02-10 ENCOUNTER — INFUSION THERAPY VISIT (OUTPATIENT)
Dept: INFUSION THERAPY | Facility: CLINIC | Age: 32
End: 2022-02-10
Payer: COMMERCIAL

## 2022-02-10 VITALS
RESPIRATION RATE: 16 BRPM | OXYGEN SATURATION: 98 % | HEART RATE: 79 BPM | TEMPERATURE: 98.2 F | SYSTOLIC BLOOD PRESSURE: 106 MMHG | DIASTOLIC BLOOD PRESSURE: 70 MMHG

## 2022-02-10 DIAGNOSIS — D50.8 IRON DEFICIENCY ANEMIA SECONDARY TO INADEQUATE DIETARY IRON INTAKE: Primary | ICD-10-CM

## 2022-02-10 PROCEDURE — 96365 THER/PROPH/DIAG IV INF INIT: CPT | Performed by: INTERNAL MEDICINE

## 2022-02-10 PROCEDURE — 99207 PR NO CHARGE LOS: CPT

## 2022-02-10 RX ORDER — MEPERIDINE HYDROCHLORIDE 25 MG/ML
25 INJECTION INTRAMUSCULAR; INTRAVENOUS; SUBCUTANEOUS EVERY 30 MIN PRN
Status: CANCELLED | OUTPATIENT
Start: 2022-02-12

## 2022-02-10 RX ORDER — ALBUTEROL SULFATE 90 UG/1
1-2 AEROSOL, METERED RESPIRATORY (INHALATION)
Status: CANCELLED
Start: 2022-02-12

## 2022-02-10 RX ORDER — NALOXONE HYDROCHLORIDE 0.4 MG/ML
0.2 INJECTION, SOLUTION INTRAMUSCULAR; INTRAVENOUS; SUBCUTANEOUS
Status: CANCELLED | OUTPATIENT
Start: 2022-02-12

## 2022-02-10 RX ORDER — DIPHENHYDRAMINE HYDROCHLORIDE 50 MG/ML
50 INJECTION INTRAMUSCULAR; INTRAVENOUS
Status: CANCELLED
Start: 2022-02-12

## 2022-02-10 RX ORDER — METHYLPREDNISOLONE SODIUM SUCCINATE 125 MG/2ML
125 INJECTION, POWDER, LYOPHILIZED, FOR SOLUTION INTRAMUSCULAR; INTRAVENOUS
Status: CANCELLED
Start: 2022-02-12

## 2022-02-10 RX ORDER — EPINEPHRINE 1 MG/ML
0.3 INJECTION, SOLUTION INTRAMUSCULAR; SUBCUTANEOUS EVERY 5 MIN PRN
Status: CANCELLED | OUTPATIENT
Start: 2022-02-12

## 2022-02-10 RX ORDER — HEPARIN SODIUM,PORCINE 10 UNIT/ML
5 VIAL (ML) INTRAVENOUS
Status: CANCELLED | OUTPATIENT
Start: 2022-02-12

## 2022-02-10 RX ORDER — ALBUTEROL SULFATE 0.83 MG/ML
2.5 SOLUTION RESPIRATORY (INHALATION)
Status: CANCELLED | OUTPATIENT
Start: 2022-02-12

## 2022-02-10 RX ORDER — HEPARIN SODIUM (PORCINE) LOCK FLUSH IV SOLN 100 UNIT/ML 100 UNIT/ML
5 SOLUTION INTRAVENOUS
Status: CANCELLED | OUTPATIENT
Start: 2022-02-12

## 2022-02-10 RX ADMIN — Medication 250 ML: at 16:25

## 2022-02-10 NOTE — PROGRESS NOTES
Infusion Nursing Note:  Haily LEVY Ruthmaikel presents today for Venofer 2/5.    Patient seen by provider today: No   present during visit today: Not Applicable.    Note: No changes from Tuesday.    Intravenous Access:  Peripheral IV placed.    Treatment Conditions:  Not Applicable.    Post Infusion Assessment:  Patient tolerated infusion without incident.  Blood return noted pre and post infusion.  Site patent and intact, free from redness, edema or discomfort.  No evidence of extravasations.  Access discontinued per protocol.     Discharge Plan:   Patient will return 2/14/2022 for next appointment.   Patient discharged in stable condition accompanied by: self.  Departure Mode: Ambulatory.    Miracle Dorsey, RN-BSN, PHN, OCN  [facility]

## 2022-02-14 ENCOUNTER — INFUSION THERAPY VISIT (OUTPATIENT)
Dept: INFUSION THERAPY | Facility: CLINIC | Age: 32
End: 2022-02-14
Payer: COMMERCIAL

## 2022-02-14 VITALS
HEART RATE: 101 BPM | BODY MASS INDEX: 24.45 KG/M2 | SYSTOLIC BLOOD PRESSURE: 110 MMHG | OXYGEN SATURATION: 98 % | TEMPERATURE: 97.6 F | WEIGHT: 160.8 LBS | RESPIRATION RATE: 16 BRPM | DIASTOLIC BLOOD PRESSURE: 74 MMHG

## 2022-02-14 DIAGNOSIS — D50.8 IRON DEFICIENCY ANEMIA SECONDARY TO INADEQUATE DIETARY IRON INTAKE: Primary | ICD-10-CM

## 2022-02-14 PROCEDURE — 96365 THER/PROPH/DIAG IV INF INIT: CPT | Performed by: INTERNAL MEDICINE

## 2022-02-14 PROCEDURE — 99207 PR NO CHARGE LOS: CPT

## 2022-02-14 RX ORDER — METHYLPREDNISOLONE SODIUM SUCCINATE 125 MG/2ML
125 INJECTION, POWDER, LYOPHILIZED, FOR SOLUTION INTRAMUSCULAR; INTRAVENOUS
Status: CANCELLED
Start: 2022-02-16

## 2022-02-14 RX ORDER — NALOXONE HYDROCHLORIDE 0.4 MG/ML
0.2 INJECTION, SOLUTION INTRAMUSCULAR; INTRAVENOUS; SUBCUTANEOUS
Status: CANCELLED | OUTPATIENT
Start: 2022-02-16

## 2022-02-14 RX ORDER — ALBUTEROL SULFATE 0.83 MG/ML
2.5 SOLUTION RESPIRATORY (INHALATION)
Status: CANCELLED | OUTPATIENT
Start: 2022-02-16

## 2022-02-14 RX ORDER — EPINEPHRINE 1 MG/ML
0.3 INJECTION, SOLUTION INTRAMUSCULAR; SUBCUTANEOUS EVERY 5 MIN PRN
Status: CANCELLED | OUTPATIENT
Start: 2022-02-16

## 2022-02-14 RX ORDER — DIPHENHYDRAMINE HYDROCHLORIDE 50 MG/ML
50 INJECTION INTRAMUSCULAR; INTRAVENOUS
Status: CANCELLED
Start: 2022-02-16

## 2022-02-14 RX ORDER — HEPARIN SODIUM (PORCINE) LOCK FLUSH IV SOLN 100 UNIT/ML 100 UNIT/ML
5 SOLUTION INTRAVENOUS
Status: CANCELLED | OUTPATIENT
Start: 2022-02-16

## 2022-02-14 RX ORDER — HEPARIN SODIUM,PORCINE 10 UNIT/ML
5 VIAL (ML) INTRAVENOUS
Status: CANCELLED | OUTPATIENT
Start: 2022-02-16

## 2022-02-14 RX ORDER — ALBUTEROL SULFATE 90 UG/1
1-2 AEROSOL, METERED RESPIRATORY (INHALATION)
Status: CANCELLED
Start: 2022-02-16

## 2022-02-14 RX ORDER — MEPERIDINE HYDROCHLORIDE 25 MG/ML
25 INJECTION INTRAMUSCULAR; INTRAVENOUS; SUBCUTANEOUS EVERY 30 MIN PRN
Status: CANCELLED | OUTPATIENT
Start: 2022-02-16

## 2022-02-14 RX ADMIN — Medication 250 ML: at 11:42

## 2022-02-14 ASSESSMENT — PAIN SCALES - GENERAL: PAINLEVEL: NO PAIN (0)

## 2022-02-14 NOTE — PROGRESS NOTES
Infusion Nursing Note:  Haily Lopez presents today for Venofer.    Patient seen by provider today: No   present during visit today: Not Applicable.    Note: Pt reports she has a UTI and is on antibiotics - she has 3 days left and reports her symptoms are resolving. The pt denies any other medical concerns today and reports tolerating her infusions well.       Intravenous Access:  Peripheral IV placed.    Treatment Conditions:  Not Applicable.      Post Infusion Assessment:  Patient tolerated infusion without incident.  Site patent and intact, free from redness, edema or discomfort.  No evidence of extravasations.  Access discontinued per protocol.       Discharge Plan:   AVS to patient via MYCHART.  Patient will return 2/18/22 for next appointment.   Patient discharged in stable condition accompanied by: self.  Departure Mode: Ambulatory.      Ruthy Granger RN

## 2022-02-17 ENCOUNTER — TRANSFERRED RECORDS (OUTPATIENT)
Dept: HEALTH INFORMATION MANAGEMENT | Facility: CLINIC | Age: 32
End: 2022-02-17
Payer: COMMERCIAL

## 2022-02-17 PROBLEM — Z34.01 ENCOUNTER FOR SUPERVISION OF NORMAL FIRST PREGNANCY IN FIRST TRIMESTER: Status: RESOLVED | Noted: 2018-10-25 | Resolved: 2019-02-07

## 2022-02-18 ENCOUNTER — INFUSION THERAPY VISIT (OUTPATIENT)
Dept: INFUSION THERAPY | Facility: CLINIC | Age: 32
End: 2022-02-18
Payer: COMMERCIAL

## 2022-02-18 VITALS
RESPIRATION RATE: 16 BRPM | SYSTOLIC BLOOD PRESSURE: 95 MMHG | DIASTOLIC BLOOD PRESSURE: 59 MMHG | TEMPERATURE: 97.4 F | OXYGEN SATURATION: 99 % | HEART RATE: 90 BPM

## 2022-02-18 DIAGNOSIS — D50.8 IRON DEFICIENCY ANEMIA SECONDARY TO INADEQUATE DIETARY IRON INTAKE: Primary | ICD-10-CM

## 2022-02-18 PROCEDURE — 99207 PR NO CHARGE LOS: CPT

## 2022-02-18 PROCEDURE — 96365 THER/PROPH/DIAG IV INF INIT: CPT | Performed by: INTERNAL MEDICINE

## 2022-02-18 RX ORDER — DIPHENHYDRAMINE HYDROCHLORIDE 50 MG/ML
50 INJECTION INTRAMUSCULAR; INTRAVENOUS
Status: CANCELLED
Start: 2022-02-20

## 2022-02-18 RX ORDER — EPINEPHRINE 1 MG/ML
0.3 INJECTION, SOLUTION INTRAMUSCULAR; SUBCUTANEOUS EVERY 5 MIN PRN
Status: CANCELLED | OUTPATIENT
Start: 2022-02-20

## 2022-02-18 RX ORDER — METHYLPREDNISOLONE SODIUM SUCCINATE 125 MG/2ML
125 INJECTION, POWDER, LYOPHILIZED, FOR SOLUTION INTRAMUSCULAR; INTRAVENOUS
Status: CANCELLED
Start: 2022-02-20

## 2022-02-18 RX ORDER — HEPARIN SODIUM,PORCINE 10 UNIT/ML
5 VIAL (ML) INTRAVENOUS
Status: CANCELLED | OUTPATIENT
Start: 2022-02-20

## 2022-02-18 RX ORDER — ALBUTEROL SULFATE 90 UG/1
1-2 AEROSOL, METERED RESPIRATORY (INHALATION)
Status: CANCELLED
Start: 2022-02-20

## 2022-02-18 RX ORDER — MEPERIDINE HYDROCHLORIDE 25 MG/ML
25 INJECTION INTRAMUSCULAR; INTRAVENOUS; SUBCUTANEOUS EVERY 30 MIN PRN
Status: CANCELLED | OUTPATIENT
Start: 2022-02-20

## 2022-02-18 RX ORDER — HEPARIN SODIUM (PORCINE) LOCK FLUSH IV SOLN 100 UNIT/ML 100 UNIT/ML
5 SOLUTION INTRAVENOUS
Status: CANCELLED | OUTPATIENT
Start: 2022-02-20

## 2022-02-18 RX ORDER — NALOXONE HYDROCHLORIDE 0.4 MG/ML
0.2 INJECTION, SOLUTION INTRAMUSCULAR; INTRAVENOUS; SUBCUTANEOUS
Status: CANCELLED | OUTPATIENT
Start: 2022-02-20

## 2022-02-18 RX ORDER — ALBUTEROL SULFATE 0.83 MG/ML
2.5 SOLUTION RESPIRATORY (INHALATION)
Status: CANCELLED | OUTPATIENT
Start: 2022-02-20

## 2022-02-18 RX ADMIN — Medication 250 ML: at 11:22

## 2022-02-18 ASSESSMENT — PAIN SCALES - GENERAL: PAINLEVEL: NO PAIN (0)

## 2022-02-18 NOTE — PROGRESS NOTES
Infusion Nursing Note:  Haily Lopez presents today for venofer #4/5.    Patient seen by provider today: No   present during visit today: Not Applicable.      Intravenous Access:  Peripheral IV placed.    Treatment Conditions:  See systems review flow sheet. Haily states she is feeling a bit better energy wise.      Post Infusion Assessment:  Patient tolerated infusion without incident.  Blood return noted pre and post infusion.  Site patent and intact, free from redness, edema or discomfort.  No evidence of extravasations.  Access discontinued per protocol.       Discharge Plan:   Patient discharged in stable condition accompanied by: self.  Departure Mode: Ambulatory.  Verbally reviewed last venofer appointment 2/21/22.      Sandra Mercado RN

## 2022-02-21 ENCOUNTER — INFUSION THERAPY VISIT (OUTPATIENT)
Dept: INFUSION THERAPY | Facility: CLINIC | Age: 32
End: 2022-02-21
Payer: COMMERCIAL

## 2022-02-21 VITALS
TEMPERATURE: 97.9 F | WEIGHT: 167 LBS | RESPIRATION RATE: 18 BRPM | DIASTOLIC BLOOD PRESSURE: 65 MMHG | BODY MASS INDEX: 25.39 KG/M2 | SYSTOLIC BLOOD PRESSURE: 98 MMHG | OXYGEN SATURATION: 96 % | HEART RATE: 84 BPM

## 2022-02-21 DIAGNOSIS — D50.8 IRON DEFICIENCY ANEMIA SECONDARY TO INADEQUATE DIETARY IRON INTAKE: Primary | ICD-10-CM

## 2022-02-21 PROCEDURE — 96365 THER/PROPH/DIAG IV INF INIT: CPT | Performed by: NURSE PRACTITIONER

## 2022-02-21 PROCEDURE — 99207 PR NO CHARGE LOS: CPT

## 2022-02-21 RX ORDER — HEPARIN SODIUM,PORCINE 10 UNIT/ML
5 VIAL (ML) INTRAVENOUS
Status: CANCELLED | OUTPATIENT
Start: 2022-02-22

## 2022-02-21 RX ORDER — ALBUTEROL SULFATE 0.83 MG/ML
2.5 SOLUTION RESPIRATORY (INHALATION)
Status: CANCELLED | OUTPATIENT
Start: 2022-02-22

## 2022-02-21 RX ORDER — EPINEPHRINE 1 MG/ML
0.3 INJECTION, SOLUTION INTRAMUSCULAR; SUBCUTANEOUS EVERY 5 MIN PRN
Status: CANCELLED | OUTPATIENT
Start: 2022-02-22

## 2022-02-21 RX ORDER — DIPHENHYDRAMINE HYDROCHLORIDE 50 MG/ML
50 INJECTION INTRAMUSCULAR; INTRAVENOUS
Status: CANCELLED
Start: 2022-02-22

## 2022-02-21 RX ORDER — METHYLPREDNISOLONE SODIUM SUCCINATE 125 MG/2ML
125 INJECTION, POWDER, LYOPHILIZED, FOR SOLUTION INTRAMUSCULAR; INTRAVENOUS
Status: CANCELLED
Start: 2022-02-22

## 2022-02-21 RX ORDER — ALBUTEROL SULFATE 90 UG/1
1-2 AEROSOL, METERED RESPIRATORY (INHALATION)
Status: CANCELLED
Start: 2022-02-22

## 2022-02-21 RX ORDER — HEPARIN SODIUM (PORCINE) LOCK FLUSH IV SOLN 100 UNIT/ML 100 UNIT/ML
5 SOLUTION INTRAVENOUS
Status: CANCELLED | OUTPATIENT
Start: 2022-02-22

## 2022-02-21 RX ORDER — NALOXONE HYDROCHLORIDE 0.4 MG/ML
0.2 INJECTION, SOLUTION INTRAMUSCULAR; INTRAVENOUS; SUBCUTANEOUS
Status: CANCELLED | OUTPATIENT
Start: 2022-02-22

## 2022-02-21 RX ORDER — MEPERIDINE HYDROCHLORIDE 25 MG/ML
25 INJECTION INTRAMUSCULAR; INTRAVENOUS; SUBCUTANEOUS EVERY 30 MIN PRN
Status: CANCELLED | OUTPATIENT
Start: 2022-02-22

## 2022-02-21 RX ADMIN — Medication 250 ML: at 10:48

## 2022-02-21 ASSESSMENT — PAIN SCALES - GENERAL: PAINLEVEL: NO PAIN (0)

## 2022-02-21 NOTE — PROGRESS NOTES
Infusion Nursing Note:  Haily Lopez presents today for Venofer.    Patient seen by provider today: No   present during visit today: Not Applicable.    Note: Patient reports no changes from last weeks infusion. Tolerating Venofer well with no concerns at this time.      Intravenous Access:  Peripheral IV placed.    Treatment Conditions:  Not Applicable.      Post Infusion Assessment:  Patient tolerated infusion without incident.  Site patent and intact, free from redness, edema or discomfort.  No evidence of extravasations.  Access discontinued per protocol.       Discharge Plan:   AVS to patient via MYCHART.  Patient will return as needed for next appointment.   Patient discharged in stable condition accompanied by: self.  Departure Mode: Ambulatory.      Abby Miller RN

## 2022-02-23 ENCOUNTER — HOSPITAL ENCOUNTER (OUTPATIENT)
Facility: HOSPITAL | Age: 32
End: 2022-02-23
Admitting: OBSTETRICS & GYNECOLOGY
Payer: COMMERCIAL

## 2022-03-18 ENCOUNTER — TRANSFERRED RECORDS (OUTPATIENT)
Dept: HEALTH INFORMATION MANAGEMENT | Facility: CLINIC | Age: 32
End: 2022-03-18
Payer: COMMERCIAL

## 2022-04-28 ENCOUNTER — LAB REQUISITION (OUTPATIENT)
Dept: LAB | Facility: CLINIC | Age: 32
End: 2022-04-28
Payer: COMMERCIAL

## 2022-04-28 LAB
FERRITIN SERPL-MCNC: 357 NG/ML (ref 10–130)
HGB BLD-MCNC: 13.7 G/DL (ref 11.7–15.7)

## 2022-04-28 PROCEDURE — 83036 HEMOGLOBIN GLYCOSYLATED A1C: CPT | Mod: ORL | Performed by: ADVANCED PRACTICE MIDWIFE

## 2022-04-28 PROCEDURE — 82728 ASSAY OF FERRITIN: CPT | Mod: ORL | Performed by: ADVANCED PRACTICE MIDWIFE

## 2022-04-28 PROCEDURE — 85018 HEMOGLOBIN: CPT | Mod: ORL | Performed by: ADVANCED PRACTICE MIDWIFE

## 2022-04-29 LAB — HBA1C MFR BLD: 5.7 %

## 2022-07-23 ENCOUNTER — HEALTH MAINTENANCE LETTER (OUTPATIENT)
Age: 32
End: 2022-07-23

## 2022-10-01 ENCOUNTER — HEALTH MAINTENANCE LETTER (OUTPATIENT)
Age: 32
End: 2022-10-01

## 2023-01-30 ENCOUNTER — APPOINTMENT (OUTPATIENT)
Dept: URBAN - METROPOLITAN AREA CLINIC 254 | Age: 33
Setting detail: DERMATOLOGY
End: 2023-01-30

## 2023-01-30 VITALS — HEIGHT: 68 IN | WEIGHT: 140 LBS

## 2023-01-30 DIAGNOSIS — D22 MELANOCYTIC NEVI: ICD-10-CM

## 2023-01-30 DIAGNOSIS — Z71.89 OTHER SPECIFIED COUNSELING: ICD-10-CM

## 2023-01-30 DIAGNOSIS — L81.4 OTHER MELANIN HYPERPIGMENTATION: ICD-10-CM

## 2023-01-30 DIAGNOSIS — D18.0 HEMANGIOMA: ICD-10-CM

## 2023-01-30 PROBLEM — D18.01 HEMANGIOMA OF SKIN AND SUBCUTANEOUS TISSUE: Status: ACTIVE | Noted: 2023-01-30

## 2023-01-30 PROBLEM — D22.72 MELANOCYTIC NEVI OF LEFT LOWER LIMB, INCLUDING HIP: Status: ACTIVE | Noted: 2023-01-30

## 2023-01-30 PROBLEM — D22.71 MELANOCYTIC NEVI OF RIGHT LOWER LIMB, INCLUDING HIP: Status: ACTIVE | Noted: 2023-01-30

## 2023-01-30 PROBLEM — D22.5 MELANOCYTIC NEVI OF TRUNK: Status: ACTIVE | Noted: 2023-01-30

## 2023-01-30 PROCEDURE — 99203 OFFICE O/P NEW LOW 30 MIN: CPT

## 2023-01-30 PROCEDURE — OTHER COUNSELING: OTHER

## 2023-01-30 PROCEDURE — OTHER MIPS QUALITY: OTHER

## 2023-01-30 PROCEDURE — OTHER SUNSCREEN RECOMMENDATIONS: OTHER

## 2023-01-30 ASSESSMENT — LOCATION DETAILED DESCRIPTION DERM
LOCATION DETAILED: LEFT MEDIAL UPPER BACK
LOCATION DETAILED: RIGHT SUPERIOR MEDIAL UPPER BACK
LOCATION DETAILED: LOWER STERNUM
LOCATION DETAILED: RIGHT CENTRAL TEMPLE
LOCATION DETAILED: SUPERIOR LUMBAR SPINE
LOCATION DETAILED: RIGHT MEDIAL SUPERIOR CHEST
LOCATION DETAILED: LEFT DISTAL POSTERIOR THIGH
LOCATION DETAILED: RIGHT POPLITEAL SKIN
LOCATION DETAILED: UPPER STERNUM

## 2023-01-30 ASSESSMENT — LOCATION SIMPLE DESCRIPTION DERM
LOCATION SIMPLE: LEFT UPPER BACK
LOCATION SIMPLE: RIGHT TEMPLE
LOCATION SIMPLE: RIGHT POPLITEAL SKIN
LOCATION SIMPLE: RIGHT UPPER BACK
LOCATION SIMPLE: LOWER BACK
LOCATION SIMPLE: CHEST
LOCATION SIMPLE: LEFT POSTERIOR THIGH

## 2023-01-30 ASSESSMENT — LOCATION ZONE DERM
LOCATION ZONE: LEG
LOCATION ZONE: TRUNK
LOCATION ZONE: FACE

## 2023-08-12 ENCOUNTER — HEALTH MAINTENANCE LETTER (OUTPATIENT)
Age: 33
End: 2023-08-12

## 2024-09-29 ENCOUNTER — HEALTH MAINTENANCE LETTER (OUTPATIENT)
Age: 34
End: 2024-09-29

## 2025-05-21 ENCOUNTER — TRANSCRIBE ORDERS (OUTPATIENT)
Dept: OTHER | Age: 35
End: 2025-05-21

## 2025-05-21 DIAGNOSIS — R14.0 BLOATING SYMPTOM: Primary | ICD-10-CM

## 2025-05-22 ENCOUNTER — PATIENT OUTREACH (OUTPATIENT)
Dept: CARE COORDINATION | Facility: CLINIC | Age: 35
End: 2025-05-22
Payer: COMMERCIAL

## 2025-05-26 ENCOUNTER — PATIENT OUTREACH (OUTPATIENT)
Dept: CARE COORDINATION | Facility: CLINIC | Age: 35
End: 2025-05-26
Payer: COMMERCIAL